# Patient Record
Sex: MALE | URBAN - METROPOLITAN AREA
[De-identification: names, ages, dates, MRNs, and addresses within clinical notes are randomized per-mention and may not be internally consistent; named-entity substitution may affect disease eponyms.]

---

## 2020-06-29 ENCOUNTER — OFFICE VISIT (OUTPATIENT)
Dept: URBAN - METROPOLITAN AREA CLINIC 52 | Facility: CLINIC | Age: 75
End: 2020-06-29
Payer: MEDICARE

## 2020-06-29 DIAGNOSIS — H52.4 PRESBYOPIA: ICD-10-CM

## 2020-06-29 DIAGNOSIS — H25.13 AGE-RELATED NUCLEAR CATARACT, BILATERAL: Primary | ICD-10-CM

## 2020-06-29 DIAGNOSIS — H16.143 PUNCTATE KERATITIS, BILATERAL: Chronic | ICD-10-CM

## 2020-06-29 DIAGNOSIS — H43.813 VITREOUS DEGENERATION, BILATERAL: ICD-10-CM

## 2020-06-29 PROCEDURE — 92014 COMPRE OPH EXAM EST PT 1/>: CPT | Performed by: OPHTHALMOLOGY

## 2020-06-29 ASSESSMENT — INTRAOCULAR PRESSURE
OS: 20
OD: 20

## 2020-06-29 NOTE — IMPRESSION/PLAN
Impression: Presbyopia: H52.4. Plan: New CL RX. [facility].  1 day Acuvue Moist BC 9.0 OD +5.00 OS +2.25

## 2020-06-29 NOTE — IMPRESSION/PLAN
Impression: Punctate keratitis, bilateral: H16.143. Plan: Dry eyes account for the patient's complaints. There is no evidence of permanent changes to the cornea. Explained condition does not have a cure and will need artificial tears for maintenance. Pt to use AFT at least 1 gtt qid and genteal qhs.  Pt may also use warm compresses to help the glands open and function properly